# Patient Record
Sex: MALE | Race: BLACK OR AFRICAN AMERICAN | Employment: OTHER | URBAN - METROPOLITAN AREA
[De-identification: names, ages, dates, MRNs, and addresses within clinical notes are randomized per-mention and may not be internally consistent; named-entity substitution may affect disease eponyms.]

---

## 2018-06-19 ENCOUNTER — HOSPITAL ENCOUNTER (EMERGENCY)
Facility: CLINIC | Age: 23
Discharge: HOME | End: 2018-06-19
Attending: EMERGENCY MEDICINE | Admitting: EMERGENCY MEDICINE

## 2018-06-19 VITALS
BODY MASS INDEX: 31.18 KG/M2 | DIASTOLIC BLOOD PRESSURE: 65 MMHG | TEMPERATURE: 98.8 F | HEIGHT: 66 IN | OXYGEN SATURATION: 98 % | WEIGHT: 194 LBS | RESPIRATION RATE: 16 BRPM | SYSTOLIC BLOOD PRESSURE: 133 MMHG

## 2018-06-19 DIAGNOSIS — L03.031 PARONYCHIA OF TOE, RIGHT: ICD-10-CM

## 2018-06-19 PROCEDURE — 25000132 ZZH RX MED GY IP 250 OP 250 PS 637: Performed by: EMERGENCY MEDICINE

## 2018-06-19 PROCEDURE — 99283 EMERGENCY DEPT VISIT LOW MDM: CPT

## 2018-06-19 PROCEDURE — 11765 WEDGE EXCISION SKN NAIL FOLD: CPT

## 2018-06-19 RX ORDER — CEPHALEXIN 500 MG/1
500 CAPSULE ORAL 4 TIMES DAILY
Qty: 40 CAPSULE | Refills: 0 | Status: SHIPPED | OUTPATIENT
Start: 2018-06-19 | End: 2018-06-29

## 2018-06-19 RX ORDER — IBUPROFEN 600 MG/1
600 TABLET, FILM COATED ORAL ONCE
Status: COMPLETED | OUTPATIENT
Start: 2018-06-19 | End: 2018-06-19

## 2018-06-19 RX ADMIN — IBUPROFEN 600 MG: 600 TABLET ORAL at 01:58

## 2018-06-19 ASSESSMENT — ENCOUNTER SYMPTOMS
JOINT SWELLING: 1
FEVER: 0
WOUND: 0
ARTHRALGIAS: 1
NECK STIFFNESS: 0
CHILLS: 0
COLOR CHANGE: 0
MYALGIAS: 0
NECK PAIN: 0

## 2018-06-19 NOTE — ED AVS SNAPSHOT
Emergency Department    [address] [facility] [address]    Phone:  [phone]    Fax:  [phone]                                       Magda Garcia   MRN: [id number]    Department:   Emergency Department   Date of Visit:  6/19/2018           After Visit Summary Signature Page     I have received my discharge instructions, and my questions have been answered. I have discussed any challenges I see with this plan with the nurse or doctor.    ..........................................................................................................................................  Patient/Patient Representative Signature      ..........................................................................................................................................  Patient Representative Print Name and Relationship to Patient    ..................................................               ................................................  Date                                            Time    ..........................................................................................................................................  Reviewed by Signature/Title    ...................................................              ..............................................  Date                                                            Time

## 2018-06-19 NOTE — ED PROVIDER NOTES
"  History     Chief Complaint:  Toe Pain    HPI   Magda Garcia is a 22 year old male who presents with toe pain. The patient reports that he developed right great toe pain yesterday morning that has increased since onset. The area is now slightly swollen and red, and pain is increased with ambulation. Patient denies any open wounds, trauma, fevers, numbness, weakness, or any other symptoms.    Allergies:  No known drug allergies.    Medications:    The patient is not currently taking any prescribed medications.     Past Medical History:    No significant past medical history.     Past Surgical History:    No pertinent past surgical history.    Family History:    No pertinent family history.    Social History:  Smoking status: Never smoker  Alcohol use: No  Marital Status:  Unknown      Review of Systems   Constitutional: Negative for chills and fever.   Musculoskeletal: Positive for arthralgias and joint swelling. Negative for gait problem, myalgias, neck pain and neck stiffness.   Skin: Negative for color change and wound.   All other systems reviewed and are negative.    Physical Exam     Patient Vitals for the past 24 hrs:   BP Temp Temp src Heart Rate Resp SpO2 Height Weight   06/19/18 0033 133/65 98.8  F (37.1  C) Oral 74 16 98 % 1.676 m (5' 6\") 88 kg (194 lb)         Physical Exam  Nursing note and vitals reviewed.  Constitutional:  Appears well-developed and well-nourished.   HENT:   Head:    Atraumatic.   Eyes:    Pupils are equal, round, and reactive to light.   Neck:    Normal range of motion. Neck supple.      No tracheal deviation present. No thyromegaly present.   Cardiovascular:  Normal rate, regular rhythm, no murmur   Pulmonary/Chest: Breath sounds are clear and equal without wheezes or crackles.  Musculoskeletal:  Exhibits no edema.      Erythema, tenderness, and swelling to the paronychia of the dorsum of the great toe, just proximal and lateral to the toe nail with a  Small amount of yellowish " drainage to the lateral aspect of toe. No lymphangitis   Lymphadenopathy:  No cervical adenopathy.   Neurological:   Alert and oriented to person, place, and time.   Skin:    Skin is warm and dry. No rash noted. No pallor.     Emergency Department Course   Procedures:  Wedge Nail Excision  Anesthesia: Digital block  Right toe great-side;   Indications: Ingrown nail, No signs of infection  Anesthesia: 0.25 % Marcaine 3ml.   Description:  27g needle used, nerve area infiltrated.  The patient tolerated the procedure well and there were no complications. The patient had adequate pain relief after the procedure.Lateral aspect of nail completely removed    Interventions:  (0148) Ibuprofen, 600 mg, PO    Emergency Department Course:  Nursing notes and vitals reviewed.  (0106) I performed an exam of the patient as documented above.    I performed a wedge toe nail excision on the patient in the ED, see above note for details.    Findings and plan explained to the patient. Patient discharged home with instructions regarding supportive care, medications, and reasons to return. The importance of close follow-up was reviewed. The patient was prescribed Keflex.    Impression & Plan    Medical Decision Making:  Magda Garcia is a 22 year old male who presents for evaluation of a painful right toe.  This is consistent with a paronychia.  Incision and drainage yielded pus, the nail bed was also decompressed.  No signs of lymphangitis, marked cellulitis, gangrene, or other worrisome soft tissue/bony issue at this time.  Plan to f/u with podiatry or primary for wound recheck.  Would start oral abx at this time. Warm soaks 3 x daily for 3-5 days Bacitracin and wound care discussed BID.  Stable for discharge.      Diagnosis:    ICD-10-CM   1. Paronychia of toe, right L03.031       Disposition:  Patient is discharged to home.      Discharge Medications:  New Prescriptions    CEPHALEXIN (KEFLEX) 500 MG CAPSULE    Take 1 capsule (500  mg) by mouth 4 times daily for 10 days           IAtul, am serving as a scribe on 6/19/2018 at 1:06 AM to personally document services performed by Dr. Jolly based on my observations and the provider's statements to me.          [address]ul [address]  6/19/2018    EMERGENCY DEPARTMENT       Lauryn Jolly MD  06/19/18 0335

## 2018-06-19 NOTE — DISCHARGE INSTRUCTIONS
Soak your toe in Epsom salts and warm water 3 times a day for 20 minutes and keep your toe elevated as much as possible for the next 3-4 days.

## 2018-06-19 NOTE — ED AVS SNAPSHOT
Emergency Department    [address] [facility] [address]    Phone:  [phone]    Fax:  [phone]                                       Magda Garcia   MRN: [id number]    Department:   Emergency Department   Date of Visit:  6/19/2018           Patient Information     Date Of Birth          1995        Your diagnoses for this visit were:     Paronychia of toe, right        You were seen by Lauryn Jolly MD.      Follow-up Information     Follow up with  Emergency Department.    Specialty:  EMERGENCY MEDICINE    Why:  As needed, If symptoms worsen or for fever or a red streak up your foot or leg.    Contact information:    [address] [facility] [address] [phone]        Discharge Instructions       Soak your toe in Epsom salts and warm water 3 times a day for 20 minutes and keep your toe elevated as much as possible for the next 3-4 days.    Discharge References/Attachments     PARONYCHIA OF THE FINGER OR TOE (ENGLISH)      24 Hour Appointment Hotline       To make an appointment at any [facility] call [phone] ([phone]). If you don't have a family doctor or clinic, we will help you find one. [facility] clinics are conveniently located to serve the needs of you and your family.             Review of your medicines      START taking        Dose / Directions Last dose taken    cephALEXin 500 MG capsule   Commonly known as:  KEFLEX   Dose:  500 mg   Quantity:  40 capsule        Take 1 capsule (500 mg) by mouth 4 times daily for 10 days   Refills:  0          Our records show that you are taking the medicines listed below. If these are incorrect, please call your family doctor or clinic.        Dose / Directions Last dose taken    UNKNOWN TO PATIENT        Refills:  0                Prescriptions were sent or printed at these locations (1 Prescription)                   Other Prescriptions                Printed at Department/Unit printer  (1 of 1)         cephALEXin (KEFLEX) 500 MG capsule                Orders Needing Specimen Collection     None      Pending Results     No orders found from 6/17/2018 to 6/20/2018.            Pending Culture Results     No orders found from 6/17/2018 to 6/20/2018.            Pending Results Instructions     If you had any lab results that were not finalized at the time of your Discharge, you can call the ED Lab Result RN at [phone]. You will be contacted by this team for any positive Lab results or changes in treatment. The nurses are available 7 days a week from 10A to 6:30P.  You can leave a message 24 hours per day and they will return your call.        Test Results From Your Hospital Stay               Clinical Quality Measure: Blood Pressure Screening     Your blood pressure was checked while you were in the emergency department today. The last reading we obtained was  BP: 133/65 . Please read the guidelines below about what these numbers mean and what you should do about them.  If your systolic blood pressure (the top number) is less than 120 and your diastolic blood pressure (the bottom number) is less than 80, then your blood pressure is normal. There is nothing more that you need to do about it.  If your systolic blood pressure (the top number) is 120-139 or your diastolic blood pressure (the bottom number) is 80-89, your blood pressure may be higher than it should be. You should have your blood pressure rechecked within a year by a primary care provider.  If your systolic blood pressure (the top number) is 140 or greater or your diastolic blood pressure (the bottom number) is 90 or greater, you may have high blood pressure. High blood pressure is treatable, but if left untreated over time it can put you at risk for heart attack, stroke, or kidney failure. You should have your blood pressure rechecked by a primary care provider within the next 4 weeks.  If your provider in the emergency department  "today gave you specific instructions to follow-up with your doctor or provider even sooner than that, you should follow that instruction and not wait for up to 4 weeks for your follow-up visit.        Thank you for choosing [facility]       Thank you for choosing [facility] for your care. Our goal is always to provide you with excellent care. Hearing back from our patients is one way we can continue to improve our services. Please take a few minutes to complete the written survey that you may receive in the mail after you visit with us. Thank you!        [employer] Information     [employer] lets you send messages to your doctor, view your test results, renew your prescriptions, schedule appointments and more. To sign up, go to [email or web address] . Click on \"Log in\" on the left side of the screen, which will take you to the Welcome page. Then click on \"Sign up Now\" on the right side of the page.     You will be asked to enter the access code listed below, as well as some personal information. Please follow the directions to create your username and password.     Your access code is: [email or web address]  Expires: 2018  1:50 AM     Your access code will  in 90 days. If you need help or a new code, please call your [facility] clinic or [phone].        Care EveryWhere ID     This is your Care EveryWhere ID. This could be used by other organizations to access your [facility] medical records  [id number]        Equal Access to Services     MIGNON TAVERAS : Hadii annika Guillaume, waaxda lugaganadaha, qaybta kaalmada abad, kraig granado . So [facility] [phone].    ATENCIÓN: Si habla español, tiene a mendez disposición servicios gratuitos de asistencia lingüística. Maikel al [phone].    We comply with applicable federal civil rights laws and [place] laws. We do not discriminate on the basis of race, color, national origin, age, disability, sex, sexual orientation, or gender identity.       "      After Visit Summary       This is your record. Keep this with you and show to your community pharmacist(s) and doctor(s) at your next visit.

## 2018-09-11 ENCOUNTER — APPOINTMENT (OUTPATIENT)
Dept: GENERAL RADIOLOGY | Facility: CLINIC | Age: 23
End: 2018-09-11
Attending: EMERGENCY MEDICINE

## 2018-09-11 ENCOUNTER — HOSPITAL ENCOUNTER (EMERGENCY)
Facility: CLINIC | Age: 23
Discharge: HOME | End: 2018-09-11
Attending: EMERGENCY MEDICINE | Admitting: EMERGENCY MEDICINE

## 2018-09-11 VITALS
DIASTOLIC BLOOD PRESSURE: 61 MMHG | TEMPERATURE: 98.4 F | SYSTOLIC BLOOD PRESSURE: 132 MMHG | OXYGEN SATURATION: 97 % | BODY MASS INDEX: 32.14 KG/M2 | RESPIRATION RATE: 16 BRPM | HEART RATE: 71 BPM | WEIGHT: 200 LBS | HEIGHT: 66 IN

## 2018-09-11 DIAGNOSIS — S62.525A CLOSED NONDISPLACED FRACTURE OF DISTAL PHALANX OF LEFT THUMB, INITIAL ENCOUNTER: ICD-10-CM

## 2018-09-11 PROCEDURE — 25000132 ZZH RX MED GY IP 250 OP 250 PS 637: Performed by: EMERGENCY MEDICINE

## 2018-09-11 PROCEDURE — 26750 TREAT FINGER FRACTURE EACH: CPT | Mod: LT

## 2018-09-11 PROCEDURE — 73140 X-RAY EXAM OF FINGER(S): CPT | Mod: LT

## 2018-09-11 PROCEDURE — 99284 EMERGENCY DEPT VISIT MOD MDM: CPT | Mod: 25

## 2018-09-11 RX ORDER — IBUPROFEN 600 MG/1
600 TABLET, FILM COATED ORAL ONCE
Status: COMPLETED | OUTPATIENT
Start: 2018-09-11 | End: 2018-09-11

## 2018-09-11 RX ADMIN — IBUPROFEN 600 MG: 600 TABLET, FILM COATED ORAL at 02:27

## 2018-09-11 ASSESSMENT — ENCOUNTER SYMPTOMS: NUMBNESS: 1

## 2018-09-11 NOTE — ED AVS SNAPSHOT
Emergency Department    [address] [facility] [address]    Phone:  [phone]    Fax:  [phone]                                       Magda Garcia   MRN: [id number]    Department:   Emergency Department   Date of Visit:  9/11/2018           Patient Information     Date Of Birth          1995        Your diagnoses for this visit were:     Closed nondisplaced fracture of distal phalanx of left thumb, initial encounter        You were seen by Brandon Lopez DO.      Follow-up Information     [facility].    Why:  Rusty Clark    Contact information:    [address] [id number] [phone]          Follow up with  Emergency Department.    Specialty:  EMERGENCY MEDICINE    Why:  If symptoms worsen    Contact information:    [address] [facility] [id number] [phone]        Discharge Instructions         Closed Thumb Fracture  You have a broken (fractured) thumb. This causes local pain, swelling, and often bruising. This injury will usually take about 4 to 6 weeks or longer to heal. Thumb fractures may be treated with a splint or cast. This protects the thumb and holds the bone in place while it heals. More serious fractures may need surgery.     If the thumbnail has been severely injured, it may fall off in 1 to 2 weeks. A new thumbnail will usually start to grow back within a month.  Home care  Follow these guidelines when caring for yourself at home:    Keep your arm elevated to reduce pain and swelling. When sitting or lying down elevate your arm above the level of your heart. You can do this by placing your arm on a pillow that rests on your chest or on a pillow at your side. This is most important during the first 2 days (48 hours) after the injury.    Put an ice pack on the injured area. Do this for 20 minutes every 1 to 2 hours the first day for pain relief. You can make an ice pack by  wrapping a plastic bag of ice cubes in a thin towel. As the ice melts, be careful that the cast or splint doesn t get wet. Continue using the ice pack 3 to 4 times a day for the next 2 days. Then use the ice pack as needed to ease pain and swelling.    If a splint was put on, leave this in place for the time advised. This will keep the bones from moving out of position.    Keep the cast or splint completely dry at all times. Bathe with your cast or splint out of the water. Protect it with a large plastic bag, rubber-banded at the top end. If a fiberglass cast or splint gets wet, you can dry it with a hair dryer.    You may use acetaminophen or ibuprofen to control pain, unless another pain medicine was prescribed. If you have chronic liver or kidney disease, talk with your healthcare provider before using these medicines. Also talk with your provider if you ve had a stomach ulcer or gastrointestinal bleeding.    Don t put creams or objects under the cast if you have itching.  Follow-up care  Follow up with your healthcare provider in 1 week, or as advised. This is to make sure the bone is healing the way it should. Talk with your provider about when it is safe to return to sports or work.  X-rays may be taken. You will be told of any new findings that may affect your care.  When to seek medical advice  Call your healthcare provider right away if any of these occur:    The cast or splint cracks    The plaster cast or splint becomes wet or soft    The fiberglass cast or splint stays wet for more than 24 hours    Bad odor from the cast or wound fluid stains the cast    Pain or swelling gets worse    Redness or warmth in the hand    Fingers or hand become cold, blue, numb, or tingly    You can t move your hand or fingers    Skin around cast or splint becomes red    Fever of 100.4 F (38 C) or higher, or as directed by your healthcare provider  Date Last Reviewed: 2/1/2017 2000-2017 [employer] [address]  [place] [address]. All rights reserved. This information is not intended as a substitute for professional medical care. Always follow your healthcare professional's instructions.          24 Hour Appointment Hotline       To make an appointment at any [facility] call [phone] ([phone]). If you don't have a family doctor or clinic, we will help you find one. [facility] are conveniently located to serve the needs of you and your family.             Review of your medicines      Our records show that you are taking the medicines listed below. If these are incorrect, please call your family doctor or clinic.        Dose / Directions Last dose taken    UNKNOWN TO PATIENT        Refills:  0                Procedures and tests performed during your visit     Fingers XR, 2-3 views, left      Orders Needing Specimen Collection     None      Pending Results     Date and Time Order Name Status Description    9/11/2018 0145 Fingers XR, 2-3 views, left Preliminary             Pending Culture Results     No orders found from 9/9/2018 to 9/12/2018.            Pending Results Instructions     If you had any lab results that were not finalized at the time of your Discharge, you can call the ED Lab Result RN at [phone]. You will be contacted by this team for any positive Lab results or changes in treatment. The nurses are available 7 days a week from 10A to 6:30P.  You can leave a message 24 hours per day and they will return your call.        Test Results From Your Hospital Stay        9/11/2018  2:09 AM      Narrative     XR FINGER LT G/E 2 VW  9/11/2018 1:59 AM     HISTORY: Pain.    COMPARISON: None.         Impression     IMPRESSION: Three views of the left thumb. Nondisplaced fracture at  the base of the distal phalanx extending to the articular surface.                Clinical Quality Measure: Blood Pressure Screening     Your blood pressure was checked while you were in the  "emergency department today. The last reading we obtained was  BP: 128/66 . Please read the guidelines below about what these numbers mean and what you should do about them.  If your systolic blood pressure (the top number) is less than 120 and your diastolic blood pressure (the bottom number) is less than 80, then your blood pressure is normal. There is nothing more that you need to do about it.  If your systolic blood pressure (the top number) is 120-139 or your diastolic blood pressure (the bottom number) is 80-89, your blood pressure may be higher than it should be. You should have your blood pressure rechecked within a year by a primary care provider.  If your systolic blood pressure (the top number) is 140 or greater or your diastolic blood pressure (the bottom number) is 90 or greater, you may have high blood pressure. High blood pressure is treatable, but if left untreated over time it can put you at risk for heart attack, stroke, or kidney failure. You should have your blood pressure rechecked by a primary care provider within the next 4 weeks.  If your provider in the emergency department today gave you specific instructions to follow-up with your doctor or provider even sooner than that, you should follow that instruction and not wait for up to 4 weeks for your follow-up visit.        Thank you for choosing [facility]       Thank you for choosing [facility] for your care. Our goal is always to provide you with excellent care. Hearing back from our patients is one way we can continue to improve our services. Please take a few minutes to complete the written survey that you may receive in the mail after you visit with us. Thank you!        [employer] Information     [employer] lets you send messages to your doctor, view your test results, renew your prescriptions, schedule appointments and more. To sign up, go to [email or web address] . Click on \"Log in\" on the left side of the screen, which will take you to the " "Welcome page. Then click on \"Sign up Now\" on the right side of the page.     You will be asked to enter the access code listed below, as well as some personal information. Please follow the directions to create your username and password.     Your access code is: [email or web address]  Expires: 2018  1:50 AM     Your access code will  in 90 days. If you need help or a new code, please call your [facility] clinic or [phone].        Care EveryWhere ID     This is your Care EveryWhere ID. This could be used by other organizations to access your [facility] medical records  [id number]        Equal Access to Services     [facility] : Lucas Guillaume, marylu vasquez, case melgoza, kraig granado . So [facility] [phone].    ATENCIÓN: Si habla español, tiene a mendez disposición servicios gratuitos de asistencia lingüística. Llame al [phone].    We comply with applicable federal civil rights laws and [place] laws. We do not discriminate on the basis of race, color, national origin, age, disability, sex, sexual orientation, or gender identity.            After Visit Summary       This is your record. Keep this with you and show to your community pharmacist(s) and doctor(s) at your next visit.                  "

## 2018-09-11 NOTE — ED AVS SNAPSHOT
Emergency Department    [address] [place] [address]    Phone:  [phone]    Fax:  [phone]                                       Magda Garcia   MRN: [id number]    Department:   Emergency Department   Date of Visit:  9/11/2018           After Visit Summary Signature Page     I have received my discharge instructions, and my questions have been answered. I have discussed any challenges I see with this plan with the nurse or doctor.    ..........................................................................................................................................  Patient/Patient Representative Signature      ..........................................................................................................................................  Patient Representative Print Name and Relationship to Patient    ..................................................               ................................................  Date                                            Time    ..........................................................................................................................................  Reviewed by Signature/Title    ...................................................              ..............................................  Date                                                            Time          22EPIC Rev 08/18

## 2018-09-11 NOTE — DISCHARGE INSTRUCTIONS
Closed Thumb Fracture  You have a broken (fractured) thumb. This causes local pain, swelling, and often bruising. This injury will usually take about 4 to 6 weeks or longer to heal. Thumb fractures may be treated with a splint or cast. This protects the thumb and holds the bone in place while it heals. More serious fractures may need surgery.     If the thumbnail has been severely injured, it may fall off in 1 to 2 weeks. A new thumbnail will usually start to grow back within a month.  Home care  Follow these guidelines when caring for yourself at home:    Keep your arm elevated to reduce pain and swelling. When sitting or lying down elevate your arm above the level of your heart. You can do this by placing your arm on a pillow that rests on your chest or on a pillow at your side. This is most important during the first 2 days (48 hours) after the injury.    Put an ice pack on the injured area. Do this for 20 minutes every 1 to 2 hours the first day for pain relief. You can make an ice pack by wrapping a plastic bag of ice cubes in a thin towel. As the ice melts, be careful that the cast or splint doesn t get wet. Continue using the ice pack 3 to 4 times a day for the next 2 days. Then use the ice pack as needed to ease pain and swelling.    If a splint was put on, leave this in place for the time advised. This will keep the bones from moving out of position.    Keep the cast or splint completely dry at all times. Bathe with your cast or splint out of the water. Protect it with a large plastic bag, rubber-banded at the top end. If a fiberglass cast or splint gets wet, you can dry it with a hair dryer.    You may use acetaminophen or ibuprofen to control pain, unless another pain medicine was prescribed. If you have chronic liver or kidney disease, talk with your healthcare provider before using these medicines. Also talk with your provider if you ve had a stomach ulcer or gastrointestinal bleeding.    Don t put  creams or objects under the cast if you have itching.  Follow-up care  Follow up with your healthcare provider in 1 week, or as advised. This is to make sure the bone is healing the way it should. Talk with your provider about when it is safe to return to sports or work.  X-rays may be taken. You will be told of any new findings that may affect your care.  When to seek medical advice  Call your healthcare provider right away if any of these occur:    The cast or splint cracks    The plaster cast or splint becomes wet or soft    The fiberglass cast or splint stays wet for more than 24 hours    Bad odor from the cast or wound fluid stains the cast    Pain or swelling gets worse    Redness or warmth in the hand    Fingers or hand become cold, blue, numb, or tingly    You can t move your hand or fingers    Skin around cast or splint becomes red    Fever of 100.4 F (38 C) or higher, or as directed by your healthcare provider  Date Last Reviewed: 2/1/2017 2000-2017 [employer]. [address] [place] [address]. All rights reserved. This information is not intended as a substitute for professional medical care. Always follow your healthcare professional's instructions.

## 2018-09-11 NOTE — ED NOTES
Pt to radiology.  Marcia Gonzalez RN,.......................................... 9/11/2018   1:50 AM

## 2018-09-11 NOTE — ED PROVIDER NOTES
"  History     Chief Complaint:  Left Thumb Pain     HPI   Magda Garcia is a 22 year old male who presents to the emergency department today for evaluation of left thumb pain. 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 The patient claims difficulty bending the thumb as well as a general numbness. The patient denies head trauma and other injuries as well as pain radiating towards his shoulders.     Allergies:  No Known Drug Allergies    Medications:    Medications reviewed. No current medications.     Past Medical History:    Medical history reviewed. No pertinent medical history.    Past Surgical History:    Surgical history reviewed. No pertinent surgical history.    Family History:    Family history reviewed. No pertinent family history.     Social History:  The patient was accompanied to the ED by friend.  Smoking Status: Current Smoker  Smokeless Tobacco: Never Used  Alcohol Use: Negative   Marital Status: Single    Review of Systems   Musculoskeletal:        Left thumb pain      Neurological: Positive for numbness (left thumb).   All other systems reviewed and are negative.    Physical Exam     Patient Vitals for the past 24 hrs:   BP Temp Temp src Pulse Resp SpO2 Height Weight   09/11/18 0054 128/66 98.4  F (36.9  C) Oral 71 16 98 % 1.676 m (5' 6\") 90.7 kg (200 lb)     Physical Exam  General:  Sitting on bed with friend at bedside, comfortable appearing.   HENT:  No obvious trauma to head  Right Ear:  External ear normal.   Left Ear:  External ear normal.   Nose:  Nose normal.   Eyes:  Conjunctivae and EOM are normal.  Neck: Normal range of motion. Neck supple. No tracheal deviation present.   Pulm/Chest: No respiratory distress  M/S: Normal range of motion. Tenderness to palpation to the left thumb distal and proximal phalanx. No pain to the first metacarpal and remainder of hand, anatomical snuff box, wrist, or elbow  Neuro: " Alert. GCS 15.  Skin: Skin is warm and dry. No rash noted. Not diaphoretic.   Psych: Normal mood and affect. Behavior is normal.     Emergency Department Course   Interventions:  Ibuprofen 600 mg PO    Imaging:  Radiology findings were communicated with the patient who voiced understanding of the findings.    Fingers XR, 2-3 views, left  Three views of the left thumb. Nondisplaced fracture at  the base of the distal phalanx extending to the articular surface.  Reading per radiology    Emergency Department Course:    0119 Nursing notes and vitals reviewed.    0135 I performed an exam of the patient as documented above.     0159 The patient was sent for a x-ray while in the emergency department, results above.     0213 Recheck and update.     I personally reviewed the imaging results with the patient and answered all related questions prior to discharge.    Impression & Plan      Medical Decision Making:  Magda Garcia is a very pleasant 22 year old year old patient who presents to the emergency department with concern of left thumb pain.  Patient is ambidextrous. His xray confirmed a left distal phalanx nondisplaced thumb fracture. Sensation intact prior with good cap refill. No evidence of any neurovascular compromise or complete tendon disruption. Simple Alum[place]oam thumb splint placed in ED.  No sign of dislocation, or need for reduction.  Indications to seek urgent reevaluation and signs compartment syndrome (including but not limited to increasing pain, redness, swelling, fevers, and drainage) were reviewed. Splint applied and patient discharged with follow up with orthopedics in one week.  An understanding of the discharge instructions and need for follow up were verbally confirmed by the patient.    The treatment plan was discussed with the patient and they expressed understanding of this plan and consented to the plan.  In addition, the patient will return to the emergency department if their symptoms  persist, worsen, if new symptoms arise or if there is any concern as other pathology may be present that is not evident at this time. They also understand the importance of close follow up in the clinic and if unable to do so will return to the emergency department for a reevaluation. All questions were answered.    Diagnosis:    ICD-10-CM   1. Closed nondisplaced fracture of distal phalanx of left thumb, initial encounter S62.525A     Disposition:   The patient is discharged to home.    Scribe Disclosure:  Seven WHITMAN, am serving as a scribe at 1:34 AM on 9/11/2018 to document services personally performed by Brandon Lopez DO based on my observations and the provider's statements to me.     EMERGENCY DEPARTMENT       Brandon Lopez DO  09/11/18 0223

## 2019-02-15 ENCOUNTER — HOSPITAL ENCOUNTER (EMERGENCY)
Facility: CLINIC | Age: 24
Discharge: HOME | End: 2019-02-15
Attending: EMERGENCY MEDICINE | Admitting: EMERGENCY MEDICINE

## 2019-02-15 ENCOUNTER — APPOINTMENT (OUTPATIENT)
Dept: GENERAL RADIOLOGY | Facility: CLINIC | Age: 24
End: 2019-02-15
Attending: EMERGENCY MEDICINE

## 2019-02-15 VITALS
HEIGHT: 66 IN | RESPIRATION RATE: 16 BRPM | BODY MASS INDEX: 31.82 KG/M2 | DIASTOLIC BLOOD PRESSURE: 84 MMHG | WEIGHT: 198 LBS | TEMPERATURE: 98.3 F | OXYGEN SATURATION: 100 % | SYSTOLIC BLOOD PRESSURE: 137 MMHG

## 2019-02-15 DIAGNOSIS — S69.92XA JAMMED INTERPHALANGEAL JOINT OF FINGER OF LEFT HAND, INITIAL ENCOUNTER: ICD-10-CM

## 2019-02-15 PROCEDURE — 99283 EMERGENCY DEPT VISIT LOW MDM: CPT

## 2019-02-15 PROCEDURE — 73140 X-RAY EXAM OF FINGER(S): CPT | Mod: LT

## 2019-02-15 ASSESSMENT — MIFFLIN-ST. JEOR: SCORE: 1835.87

## 2019-02-15 ASSESSMENT — ENCOUNTER SYMPTOMS: NUMBNESS: 0

## 2019-02-15 NOTE — ED AVS SNAPSHOT
Emergency Department  [address] [place] [address]  Phone:  [phone]  Fax:  [phone]                                    Magda Garcia   MRN: [id number]    Department:   Emergency Department   Date of Visit:  2/15/2019           After Visit Summary Signature Page    I have received my discharge instructions, and my questions have been answered. I have discussed any challenges I see with this plan with the nurse or doctor.    ..........................................................................................................................................  Patient/Patient Representative Signature      ..........................................................................................................................................  Patient Representative Print Name and Relationship to Patient    ..................................................               ................................................  Date                                   Time    ..........................................................................................................................................  Reviewed by Signature/Title    ...................................................              ..............................................  Date                                               Time          22EPIC Rev 08/18

## 2019-02-15 NOTE — ED PROVIDER NOTES
"  History     Chief Complaint:  Hand injury    The history is provided by the patient.      Magda Garcia is a 23 year old male who presents to the emergency department with his friend for evaluation of a left hand injury. Earlier today, the patient jammed his left 2nd finger while playing basketball. He has difficulty bending his finger full due to pain but denies any numbness or tingling. This injury prompted the patient to seek evaluation here in the emergency department.    Allergies:  NKDA     Medications:    The patient is not currently taking any prescribed medications.    Past Medical History:    The patient denies any significant past medical history.    Past Surgical History:    The patient does not have any pertinent past surgical history.    Family History:    No past pertinent family history.    Social History:  Heavy tobacco smoker.  Negative for alcohol use.  Negative for drug use.  Marital Status:  Single     Review of Systems   Musculoskeletal:        Positive for left 2nd finger pain   Neurological: Negative for numbness.   All other systems reviewed and are negative.    Physical Exam     Patient Vitals for the past 24 hrs:   BP Temp Temp src Heart Rate Resp SpO2 Height Weight   02/15/19 0027 (!) 145/95 98.3  F (36.8  C) Temporal 76 16 98 % 1.676 m (5' 6\") 89.8 kg (198 lb)       Physical Exam  Vitals: reviewed by me  General: Pt seen on hospital [facility], pleasant, cooperative, and alert to conversation  Eyes: Tracking well, clear conjunctiva BL  Resp:  No tachypnea, no accessory muscle use.   MSK: no obvious peripheral edema or joint effusion.   LUE with SILT to FDWS, IF, and PF.  Can OK, TU, 2/3x  UDN and RDN in tact x5  FDP and FDS in tact x4, as is ext mechanism  Flex/ext in tact of thumb  No evidence of trauma, no skin breaks.  Skin: No rash, normal turgor and temperature  Neuro: Clear speech and no facial droop.      Emergency Department Course   Imaging:  Radiographic findings were " communicated with the patient who voiced understanding of the findings.    XR Fingers, left, 2-3 views:   No visualized acute fracture or malalignment of the left  second finger. As per radiology.     Emergency Department Course:  0038 Nursing notes and vitals reviewed.  I performed an exam of the patient as documented above.     IV inserted. Medicine administered as documented above. Blood drawn. This was sent to the lab for further testing, results above.    The patient was sent for a left fingers xray while in the emergency department, findings above.     0103 I rechecked the patient and discussed the results of his workup thus far.     The patient's left 2nd finger was placed in a soft splint by nursing staff.    Findings and plan explained to the Patient. Patient discharged home with instructions regarding supportive care, medications, and reasons to return. The importance of close follow-up was reviewed.   I personally answered all related questions prior to discharge.   Impression & Plan:   Medical Decision Making:  Magda Garcia is a 23 year old male who presents to the ED with what appears to be a sprained finger. His left index finger is unremarkable on my exam, UDN, RND, FDP, FDS are all intact and he has no evidence of joint instability. Xray's are unremarkable with no fractures. We will discharge with very clear return to ED precautions and primary care follow up.     Critical Care time:  none    Diagnosis:    ICD-10-CM    1. Jammed interphalangeal joint of finger of left hand, initial encounter S69.92XA        Disposition:  discharged to home with his friend     Discharge Medications:     Medication List      ASK your doctor about these medications    cephALEXin 500 MG capsule  Commonly known as:  KEFLEX  500 mg, Oral, 4 TIMES DAILY  Ask about: Should I take this medication?          Scribe Disclosure:  I, Maria Fernanda Jackson, am serving as a scribe on 2/15/2019 at 12:39 AM to personally document services  performed by Manish Fontanez MD based on my observations and the provider's statements to me.     Maria Fernanda Jackson  2/15/2019    EMERGENCY DEPARTMENT       Manish Fontanez MD  02/15/19 0515

## 2019-05-10 ENCOUNTER — HOSPITAL ENCOUNTER (EMERGENCY)
Facility: CLINIC | Age: 24
Discharge: HOME | End: 2019-05-10
Attending: EMERGENCY MEDICINE | Admitting: EMERGENCY MEDICINE

## 2019-05-10 VITALS
BODY MASS INDEX: 30.53 KG/M2 | TEMPERATURE: 99.2 F | HEIGHT: 66 IN | OXYGEN SATURATION: 99 % | RESPIRATION RATE: 16 BRPM | DIASTOLIC BLOOD PRESSURE: 62 MMHG | SYSTOLIC BLOOD PRESSURE: 118 MMHG | WEIGHT: 190 LBS

## 2019-05-10 DIAGNOSIS — S91.331A PUNCTURE WOUND OF RIGHT FOOT, INITIAL ENCOUNTER: ICD-10-CM

## 2019-05-10 PROCEDURE — 90471 IMMUNIZATION ADMIN: CPT

## 2019-05-10 PROCEDURE — 90715 TDAP VACCINE 7 YRS/> IM: CPT | Performed by: EMERGENCY MEDICINE

## 2019-05-10 PROCEDURE — 25000132 ZZH RX MED GY IP 250 OP 250 PS 637: Performed by: EMERGENCY MEDICINE

## 2019-05-10 PROCEDURE — 99283 EMERGENCY DEPT VISIT LOW MDM: CPT | Mod: 25

## 2019-05-10 PROCEDURE — 25000128 H RX IP 250 OP 636: Performed by: EMERGENCY MEDICINE

## 2019-05-10 RX ORDER — LEVOFLOXACIN 500 MG/1
500 TABLET, FILM COATED ORAL DAILY
Qty: 7 TABLET | Refills: 0 | Status: SHIPPED | OUTPATIENT
Start: 2019-05-10 | End: 2019-05-17

## 2019-05-10 RX ORDER — LEVOFLOXACIN 500 MG/1
500 TABLET, FILM COATED ORAL ONCE
Status: COMPLETED | OUTPATIENT
Start: 2019-05-10 | End: 2019-05-10

## 2019-05-10 RX ADMIN — LEVOFLOXACIN 500 MG: 500 TABLET, FILM COATED ORAL at 02:12

## 2019-05-10 RX ADMIN — CLOSTRIDIUM TETANI TOXOID ANTIGEN (FORMALDEHYDE INACTIVATED), CORYNEBACTERIUM DIPHTHERIAE TOXOID ANTIGEN (FORMALDEHYDE INACTIVATED), BORDETELLA PERTUSSIS TOXOID ANTIGEN (GLUTARALDEHYDE INACTIVATED), BORDETELLA PERTUSSIS FILAMENTOUS HEMAGGLUTININ ANTIGEN (FORMALDEHYDE INACTIVATED), BORDETELLA PERTUSSIS PERTACTIN ANTIGEN, AND BORDETELLA PERTUSSIS FIMBRIAE 2/3 ANTIGEN 0.5 ML: 5; 2; 2.5; 5; 3; 5 INJECTION, SUSPENSION INTRAMUSCULAR at 02:13

## 2019-05-10 ASSESSMENT — ENCOUNTER SYMPTOMS: WOUND: 1

## 2019-05-10 ASSESSMENT — MIFFLIN-ST. JEOR: SCORE: 1799.58

## 2019-05-10 NOTE — ED AVS SNAPSHOT
Emergency Department  [address] [place] [address]  Phone:  [phone]  Fax:  [phone]                                    Magda Garcia   MRN: [id number]    Department:   Emergency Department   Date of Visit:  5/10/2019           After Visit Summary Signature Page    I have received my discharge instructions, and my questions have been answered. I have discussed any challenges I see with this plan with the nurse or doctor.    ..........................................................................................................................................  Patient/Patient Representative Signature      ..........................................................................................................................................  Patient Representative Print Name and Relationship to Patient    ..................................................               ................................................  Date                                   Time    ..........................................................................................................................................  Reviewed by Signature/Title    ...................................................              ..............................................  Date                                               Time          22EPIC Rev 08/18

## 2019-05-10 NOTE — ED PROVIDER NOTES
"  History     Chief Complaint:  Foot Wound    HPI   Magda Garcia is a 23 year old, otherwise healthy male who presents for evaluation of a right foot wound. About 1 hour prior to arrival, he reports he stepped on a nail while wearing shoes. He states the nail went through the sole of his shoe and punctured the skin. Because his tetanus is not up to date, he reports his family wanted him to present for any treatment. Prior to arrival, he was able to remove the nail from his foot and he states it was intact on removal. Here, he reports the foot is numb with the occasional muscle spasm. He denies any other pains.     Allergies:  NKDA    Medications:    The patient is currently on no regular medications.     Past Medical History:    The patient denies any significant past medical history.     Past Surgical History:    The patient does not have any pertinent past surgical history.     Family History:    No past pertinent family history.     Social History:  Marital Status:  Single [1]  Friend at bedside.   Positive for tobacco use. Comment: Smokes and chews.   Negative for alcohol use.     Review of Systems   Skin: Positive for wound (right foot).   All other systems reviewed and are negative.      Physical Exam     Patient Vitals for the past 24 hrs:   BP Temp Temp src Heart Rate Resp SpO2 Height Weight   05/10/19 0144 118/62 99.2  F (37.3  C) Temporal 74 16 99 % 1.676 m (5' 6\") 86.2 kg (190 lb)      Physical Exam  General: Appears well-developed and well-nourished.   Head: No signs of trauma.   CV: Normal rate and regular rhythm.    Resp: Effort normal. No respiratory distress.   MSK: Normal range of motion. no edema. No bony tenderness.  Neuro: The patient is alert and oriented.  Strength in  lower extremities normal and symmetrical.   Sensation normal. Speech normal.  GCS 15  Skin: Skin is warm and dry. Puncture wound to plantar surface of distal right midfoot.  Psych: normal mood and affect. behavior is normal. "       Emergency Department Course   Interventions:  0212 Levaquin, 500 mg, PO   0213 Tdap, 0.5 mL, IM injection    Emergency Department Course:  Nursing notes and vitals reviewed.   (0158) I performed an exam of the patient as documented above. I discussed wound care instructions at this time.      Medicine administered as documented above.     Findings and plan explained to the Patient. Patient discharged home with instructions regarding supportive care, medications, and reasons to return. The importance of close follow-up was reviewed. The patient was prescribed Levaquin.      I personally answered all related questions prior to discharge.     Impression & Plan      Medical Decision Making:  Magda Garcia is a 23 year old male who presents after accidentally stepping on a nail that went through his shoe into the bottom of his right foot.  He removed the nail prior to arrival. On my evaluation, there was a small puncture, but no signs of any retained foreign body.  There are no signs of any bony injury.  Wound was thoroughly cleaned and the patient's tetanus status was updated.  I did elect to prophylactically place the patient on Levaquin as this should cover some gram positives along with covering Pseudomonas.  I did discuss the potential risks with the flouroquinolones, but unfortunately there are no other oral alternatives to cover Pseudomonas.  Patient was discharged home with instructions for good wound management, and recommendation for close follow-up with primary care doctor for continued rechecks given the risk of infection, and instructions to return to the ER for worsening symptoms.      Diagnosis:    ICD-10-CM    1. Puncture wound of right foot, initial encounter S91.331A        Disposition:  discharged to home    Discharge Medications:     Medication List      Started    levofloxacin 500 MG tablet  Commonly known as:  LEVAQUIN  500 mg, Oral, DAILY          Scribe Disclosure:  IGina, am  serving as a scribe on 5/10/2019 at 1:58 AM to personally document services performed by Elías Bingham MD based on my observations and the provider's statements to me.     Gina Terri  5/10/2019    EMERGENCY DEPARTMENT       Elías Bingham MD  05/10/19 0659